# Patient Record
Sex: FEMALE | Race: AMERICAN INDIAN OR ALASKA NATIVE
[De-identification: names, ages, dates, MRNs, and addresses within clinical notes are randomized per-mention and may not be internally consistent; named-entity substitution may affect disease eponyms.]

---

## 2018-03-26 ENCOUNTER — HOSPITAL ENCOUNTER (OUTPATIENT)
Dept: HOSPITAL 5 - VAS | Age: 63
Discharge: HOME | End: 2018-03-26
Attending: FAMILY MEDICINE
Payer: COMMERCIAL

## 2018-03-26 DIAGNOSIS — I65.23: Primary | ICD-10-CM

## 2018-03-26 PROCEDURE — 93880 EXTRACRANIAL BILAT STUDY: CPT

## 2018-04-02 ENCOUNTER — HOSPITAL ENCOUNTER (OUTPATIENT)
Dept: HOSPITAL 5 - SPVWC | Age: 63
Discharge: HOME | End: 2018-04-02
Attending: FAMILY MEDICINE
Payer: COMMERCIAL

## 2018-04-02 DIAGNOSIS — M85.88: Primary | ICD-10-CM

## 2018-04-02 DIAGNOSIS — Z78.0: ICD-10-CM

## 2018-04-02 PROCEDURE — 77080 DXA BONE DENSITY AXIAL: CPT

## 2018-04-03 NOTE — MAMMOGRAPHY REPORT
BONE DEXA:04/02/18 10:48:00



CLINICAL: Postmenopausal. No comparison.



TECHNIQUE: Two site bone DEXA performed on an Hologic scanner.





FINDINGS:

The average BMD of the lumbar spine L1-L4 is 0.967g/cm squared with a 

T-score of -1.7 and a Z-score of 0.2.



The average BMD of the left hip is 0.818g/cm squared with a T-score of 

-1.4 and a Z-score of -0.4.  The left femoral neck BMD is 0.734g/cm 

squared with a T score of -1.5 and a Z score of -0.3.





IMPRESSION:

WHO classification: Osteopenia with increased fracture risk  based on 

L-spine and left hip measurements.









RECOMMENDATION: Clinical correlation and routine screening.





DEFINITIONS:

  BMD     = Bone Mineral Density

  T-score = BMD related to mean peak bone mass of young adult

            (mean expressed in Standard Deviation)

  Z-score = Age matched BMD expressed in SD



World Health Organization (WHO) Diagnostic Criteria

  Normal             T-score > -1 SD

  Osteopenia      T-score between -1 and -2.4 SD

  Osteoporosis    T-score -2.5 SD or below



NOTE:

      BMD is not the only risk factor for fracture. One should also 

consider factors such as the patient's age, risk of falling, previous 

osteoporotic fracture, family history of osteoporotic fractures, 

current smoker, and low body weight.

Z-scores are not calculated if >80 years of age.

## 2018-04-11 NOTE — VASCULAR LAB REPORT
CAROTID DUPLEX STUDY:



RIGHT        PSVEDV

CCA PROX:[id number]

CCA DIST:[id number]

ICA PROX:91       27

ICA MID:[id number]

ICA DIST:[id number]

ECA:               [id number]

VERT:                  61              10  



LEFT         PSVEDV

CCA PROX:[id number]

CCA DIST:[id number]

ICA PROX:[id number]

ICA MID:[id number]

ICA DIST:[id number]

ECA:                    [id number]

VERT:                  96              29





REASON FOR EXAM: Carotid artery stenosis.

     

COMMENTS ON THE RIGHT:

Doppler frequency analysis is consistent with 16 to 49 percent diameter 

reduction of the internal carotid artery.  Minimal amount of plaque is 

seen.  The common carotid artery is patent. The external carotid artery 

is patent.  The vertebral artery has antegrade flow.



COMMENTS ON THE LEFT:

Doppler frequency analysis is consistent with 16 to 49 percent diameter 

reduction of the internal carotid artery.  Minimal amount of plaque is 

seen.  The common carotid artery is patent. The external carotid artery 

is patent.  The vertebral artery has antegrade flow.



IMPRESSION:

Less than 50% diameter reduction in the internal carotid arteries 

bilaterally.